# Patient Record
Sex: FEMALE | Race: OTHER | NOT HISPANIC OR LATINO | URBAN - METROPOLITAN AREA
[De-identification: names, ages, dates, MRNs, and addresses within clinical notes are randomized per-mention and may not be internally consistent; named-entity substitution may affect disease eponyms.]

---
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
## 2025-03-31 NOTE — ED BEHAVIORAL HEALTH ASSESSMENT NOTE - NS ED [facility] [employer] MEDICAL RECORD REVIEWED YES RECORDS
Patient had vagal episode when labs drawn. Recovered, no LOC. Will allow reg light meal prior to initiating pitocin as she is hungry.   Hospital chart